# Patient Record
Sex: FEMALE | Race: WHITE | NOT HISPANIC OR LATINO | Employment: OTHER | URBAN - METROPOLITAN AREA
[De-identification: names, ages, dates, MRNs, and addresses within clinical notes are randomized per-mention and may not be internally consistent; named-entity substitution may affect disease eponyms.]

---

## 2017-06-07 ENCOUNTER — PATIENT OUTREACH (OUTPATIENT)
Dept: ADMINISTRATIVE | Facility: HOSPITAL | Age: 82
End: 2017-06-07

## 2017-06-07 NOTE — PROGRESS NOTES
Attempting to contact pt to schedule the following over due HM:  Dexa Scan-Bone Density Scan  Unable to reach patient at this time. Left voicemail.

## 2017-08-03 ENCOUNTER — PATIENT OUTREACH (OUTPATIENT)
Dept: ADMINISTRATIVE | Facility: HOSPITAL | Age: 82
End: 2017-08-03

## 2017-08-03 NOTE — LETTER
August 3, 2017    Maura Valles  [address] [address] [address]             [facility]  [address]  [facility] [address]  Phone: [phone] Dear Mrs. Valles:    [facility] currently has ANNE ROBERTO MD  listed as your Primary Care Physician. Our records indicate that you have never established care   with ANNE ROBERTO MD or have not been seen in 2 years or more.      To update your information, please contact your insurance company and request   your primary care physician be updated to reflect the [facility].      If you are a current [facility] patient and the listed physician is correct, please   call [phone] to update your information and re-establish care with ANNE ROBERTO MD.    Please disregard this letter if you have already contacted our office.    Thank you,   Hazel EASON LPN  Care Coordination Department  [facility]  Phone Number: [phone]